# Patient Record
Sex: FEMALE | Race: OTHER | Employment: PART TIME | URBAN - METROPOLITAN AREA
[De-identification: names, ages, dates, MRNs, and addresses within clinical notes are randomized per-mention and may not be internally consistent; named-entity substitution may affect disease eponyms.]

---

## 2024-08-22 ENCOUNTER — HOSPITAL ENCOUNTER (EMERGENCY)
Age: 24
Discharge: HOME | End: 2024-08-22
Attending: EMERGENCY MEDICINE

## 2024-08-22 VITALS
BODY MASS INDEX: 35.72 KG/M2 | TEMPERATURE: 99 F | RESPIRATION RATE: 17 BRPM | DIASTOLIC BLOOD PRESSURE: 86 MMHG | WEIGHT: 189.2 LBS | OXYGEN SATURATION: 99 % | HEART RATE: 80 BPM | SYSTOLIC BLOOD PRESSURE: 126 MMHG | HEIGHT: 61 IN

## 2024-08-22 DIAGNOSIS — G56.03 BILATERAL CARPAL TUNNEL SYNDROME: Primary | ICD-10-CM

## 2024-08-22 PROCEDURE — 99283 EMERGENCY DEPT VISIT LOW MDM: CPT

## 2024-08-22 RX ORDER — PREDNISONE 50 MG/1
50 TABLET ORAL DAILY
Qty: 5 TABLET | Refills: 0 | Status: SHIPPED | OUTPATIENT
Start: 2024-08-22 | End: 2024-08-27

## 2024-08-22 RX ORDER — TRAMADOL HYDROCHLORIDE 50 MG/1
50 TABLET ORAL EVERY 6 HOURS PRN
Qty: 12 TABLET | Refills: 0 | Status: SHIPPED | OUTPATIENT
Start: 2024-08-22 | End: 2024-08-25

## 2024-08-22 ASSESSMENT — PAIN SCALES - GENERAL: PAINLEVEL_OUTOF10: 5

## 2024-08-22 ASSESSMENT — LIFESTYLE VARIABLES
HOW OFTEN DO YOU HAVE A DRINK CONTAINING ALCOHOL: NEVER
HOW MANY STANDARD DRINKS CONTAINING ALCOHOL DO YOU HAVE ON A TYPICAL DAY: PATIENT DOES NOT DRINK

## 2024-08-22 ASSESSMENT — PAIN - FUNCTIONAL ASSESSMENT: PAIN_FUNCTIONAL_ASSESSMENT: 0-10

## 2024-08-22 NOTE — ED TRIAGE NOTES
C/o bilateral hand numbness that started last night. Says that overall this is a chronic problem. NIH a 0 here. Alert ambulatory and in no acute distress at this time. Ambulates steadily unassisted

## 2024-08-22 NOTE — ED NOTES
Patient mobility status  with no difficulty. Provider aware     I have reviewed discharge instructions with the patient.  The patient verbalized understanding.    Patient left ED via Discharge Method: ambulatory to Home with Significant Other.    Opportunity for questions and clarification provided.     Patient given 2 scripts.           Aminta Aguiar LPN  08/22/24 1923

## 2024-08-22 NOTE — ED PROVIDER NOTES
Emergency Department Provider Note       PCP: None, None   Age: 24 y.o.   Sex: female     DISPOSITION Discharge - Pending Orders Complete 08/22/2024 06:55:14 PM  Condition at Disposition: Stable       ICD-10-CM    1. Bilateral carpal tunnel syndrome  G56.03 [facility] [employer] [facility] Wrist Brace, Left     traMADol (ULTRAM) 50 MG tablet          Medical Decision Making     History and clinical picture consistent with bilateral carpal tunnel syndrome.  Will provide wrist splints, prescription for prednisone as well as Ultram.  Referral to [facility] for follow-up.  Work note given.     1 acute, uncomplicated illness or injury.  Prescription drug management performed.  Shared medical decision making was utilized in creating the patients health plan today.    I independently ordered and reviewed each unique test.                       History     This patient presents with a 2-week history of bilateral hand numbness and pain.  She reports the pain radiating up the forearms towards the shoulder.  This has been going on for 2 weeks and it is worse when she uses her hands.  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  She denies any falls or trauma.  She reports similar occurrence many years ago when she was living in [facility].  She does report pain is seems to be worse at night as well.    The history is provided by the patient. The history is limited by a language barrier. A  was used.     Physical Exam     Vitals signs and nursing note reviewed:  Vitals:    08/22/24 1718   BP: (!) 141/99   Pulse: (!) 112   Resp: 19   Temp: 99 °F (37.2 °C)   TempSrc: Oral   SpO2: 99%      Physical Exam  Vitals and nursing note reviewed.   Constitutional:       General: She is not in acute distress.     Appearance: Normal appearance. She is not ill-appearing or toxic-appearing.   HENT:      Head: Normocephalic and atraumatic.   Eyes:      Extraocular Movements: Extraocular

## 2024-08-22 NOTE — DISCHARGE INSTR - COC
Continuity of Care Form    Patient Name: Jennifer Cartagena   :  2000  MRN:  [id number]    Admit date:  2024  Discharge date:  ***    Code Status Order: No Order   Advance Directives:   Advance Care Flowsheet Documentation             Admitting Physician:  No admitting provider for patient encounter.  PCP: None, None    Discharging Nurse: ***  Discharging Hospital Unit/Room#: D02/D02  Discharging Unit Phone Number: ***    Emergency Contact:   Extended Emergency Contact Information  Primary Emergency Contact: Tayo Barrioso  Home Phone: [phone]  Mobile Phone: [phone]  Relation: Boyfriend    Past Surgical History:  No past surgical history on file.    Immunization History:     There is no immunization history on file for this patient.    Active Problems:  There is no problem list on file for this patient.      Isolation/Infection:   Isolation            No Isolation          Patient Infection Status       None to display            Nurse Assessment:  Last Vital Signs: /86   Pulse 80   Temp 99 °F (37.2 °C) (Oral)   Resp 17   Ht 1.54 m (5' 0.63\")   Wt 85.8 kg (189 lb 3.2 oz)   SpO2 99%   BMI 36.19 kg/m²     Last documented pain score (0-10 scale): Pain Level: 5  Last Weight:   Wt Readings from Last 1 Encounters:   24 85.8 kg (189 lb 3.2 oz)     Mental Status:  {IP PT MENTAL STATUS:65450}    IV Access:  { ODALIS IV ACCESS:469270837}    Nursing Mobility/ADLs:  Walking   {CHP DME ADLs:498145638}  Transfer  {CHP DME ADLs:547690779}  Bathing  {CHP DME ADLs:687381519}  Dressing  {CHP DME ADLs:824119624}  Toileting  {CHP DME ADLs:345524157}  Feeding  {CHP DME ADLs:429229404}  Med Admin  {CHP DME ADLs:646148012}  Med Delivery   { ODALIS MED Delivery:356901930}    Wound Care Documentation and Therapy:        Elimination:  Continence:   Bowel: {YES / NO:}  Bladder: {YES / NO:}  Urinary Catheter: {Urinary Catheter:541076241}   Colostomy/Ileostomy/Ileal Conduit: {YES /